# Patient Record
Sex: MALE | Race: WHITE | NOT HISPANIC OR LATINO | URBAN - METROPOLITAN AREA
[De-identification: names, ages, dates, MRNs, and addresses within clinical notes are randomized per-mention and may not be internally consistent; named-entity substitution may affect disease eponyms.]

---
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
## 2023-02-23 NOTE — ED PROVIDER NOTE - CHPI ED SYMPTOMS NEG
minutes on the discharge service.
no back pain/no difficulty bearing weight/no tingling/no abrasion/no numbness/no fever